# Patient Record
Sex: MALE | Race: BLACK OR AFRICAN AMERICAN | NOT HISPANIC OR LATINO | URBAN - METROPOLITAN AREA
[De-identification: names, ages, dates, MRNs, and addresses within clinical notes are randomized per-mention and may not be internally consistent; named-entity substitution may affect disease eponyms.]

---

## 2017-12-09 ENCOUNTER — EMERGENCY (EMERGENCY)
Facility: HOSPITAL | Age: 32
LOS: 1 days | Discharge: HOME | End: 2017-12-09
Attending: EMERGENCY MEDICINE | Admitting: EMERGENCY MEDICINE
Payer: COMMERCIAL

## 2017-12-09 VITALS
DIASTOLIC BLOOD PRESSURE: 68 MMHG | SYSTOLIC BLOOD PRESSURE: 127 MMHG | RESPIRATION RATE: 18 BRPM | TEMPERATURE: 98 F | HEART RATE: 75 BPM | OXYGEN SATURATION: 100 %

## 2017-12-09 VITALS
HEART RATE: 95 BPM | OXYGEN SATURATION: 96 % | TEMPERATURE: 98 F | SYSTOLIC BLOOD PRESSURE: 129 MMHG | DIASTOLIC BLOOD PRESSURE: 89 MMHG

## 2017-12-09 PROCEDURE — 99285 EMERGENCY DEPT VISIT HI MDM: CPT | Mod: 25

## 2017-12-09 PROCEDURE — 93010 ELECTROCARDIOGRAM REPORT: CPT

## 2017-12-09 PROCEDURE — 93005 ELECTROCARDIOGRAM TRACING: CPT

## 2017-12-09 PROCEDURE — 99284 EMERGENCY DEPT VISIT MOD MDM: CPT | Mod: 25

## 2017-12-09 PROCEDURE — 94640 AIRWAY INHALATION TREATMENT: CPT

## 2017-12-09 PROCEDURE — 71020: CPT | Mod: 26

## 2017-12-09 PROCEDURE — 71046 X-RAY EXAM CHEST 2 VIEWS: CPT

## 2017-12-09 RX ORDER — IPRATROPIUM/ALBUTEROL SULFATE 18-103MCG
3 AEROSOL WITH ADAPTER (GRAM) INHALATION ONCE
Qty: 0 | Refills: 0 | Status: COMPLETED | OUTPATIENT
Start: 2017-12-09 | End: 2017-12-09

## 2017-12-09 RX ADMIN — Medication 3 MILLILITER(S): at 04:12

## 2017-12-09 NOTE — ED ADULT NURSE NOTE - OBJECTIVE STATEMENT
Pt is a 32YOM hx of asthma  received ambulatory A&Ox4 complaining of SOB. patient states  SOB started this evening and progressively became worst. patient his inhaler without relief. wheezing noted, bilaterally. abdomen soft nontender nondistended. Pt denies any headache, dizziness chest pain  nausea or vomiting. patient placed on cardiac monitor, albuterol administered. will monitor support and safety provided. closely.

## 2017-12-09 NOTE — ED PROVIDER NOTE - ATTENDING CONTRIBUTION TO CARE
I was physically present for the E/M service provided. I agree with above history, physical, and plan which I have reviewed and edited where appropriate. I was physically present for the key portions of the service provided.    32M PMH of asthma p/w SOB after playing drums. No cough. No CP. CXR clears. Scant wheezes on exam throughout chest but moving good air. Able to speak in full sentences. Afebrile. Sx improved with albuterol NMT. Albuterol MDI outpt.

## 2017-12-09 NOTE — ED PROVIDER NOTE - OBJECTIVE STATEMENT
32M with past medical history asthma t/w albuterol prn presents with shortness of breath, chest tightness, chest pressure that started 5hrs ago while playing drums.  Took 2 puffs of albuterol and symptoms resolved then came back when started playing drums again.  Finished his set and came ED 4hrs later.  No current shortness of breath or chest pressure but feels tightness.  Never before been intubated or admitted.  Has had nasal congestion, mild cough and rhinorrhea for past 7d.  Denies fever, chills, headache, n/v, arm pain, jaw pain, calf pain, pleuritic pain, palpitations, recent travel.

## 2017-12-09 NOTE — ED PROVIDER NOTE - PROGRESS NOTE DETAILS
Sleeping. Breathing improved after albuterol NMT. Moving good air with mild scant wheezing on auscultation. CXR clear. Has albuterol MDI at home.

## 2019-03-13 ENCOUNTER — EMERGENCY (EMERGENCY)
Facility: HOSPITAL | Age: 34
LOS: 1 days | Discharge: HOME | End: 2019-03-13
Attending: STUDENT IN AN ORGANIZED HEALTH CARE EDUCATION/TRAINING PROGRAM
Payer: COMMERCIAL

## 2019-03-13 VITALS
SYSTOLIC BLOOD PRESSURE: 119 MMHG | TEMPERATURE: 98 F | DIASTOLIC BLOOD PRESSURE: 72 MMHG | WEIGHT: 315 LBS | HEIGHT: 71 IN | RESPIRATION RATE: 16 BRPM | HEART RATE: 71 BPM | OXYGEN SATURATION: 96 %

## 2019-03-13 VITALS
SYSTOLIC BLOOD PRESSURE: 117 MMHG | HEART RATE: 70 BPM | DIASTOLIC BLOOD PRESSURE: 76 MMHG | TEMPERATURE: 98 F | OXYGEN SATURATION: 98 % | RESPIRATION RATE: 16 BRPM

## 2019-03-13 PROBLEM — J45.909 UNSPECIFIED ASTHMA, UNCOMPLICATED: Chronic | Status: ACTIVE | Noted: 2017-12-09

## 2019-03-13 PROCEDURE — 99283 EMERGENCY DEPT VISIT LOW MDM: CPT | Mod: 25

## 2019-03-13 PROCEDURE — 99282 EMERGENCY DEPT VISIT SF MDM: CPT

## 2019-03-13 RX ORDER — BENZOCAINE AND MENTHOL 5; 1 G/100ML; G/100ML
1 LIQUID ORAL ONCE
Qty: 0 | Refills: 0 | Status: COMPLETED | OUTPATIENT
Start: 2019-03-13 | End: 2019-03-13

## 2019-03-13 RX ORDER — ACETAMINOPHEN 500 MG
650 TABLET ORAL ONCE
Qty: 0 | Refills: 0 | Status: COMPLETED | OUTPATIENT
Start: 2019-03-13 | End: 2019-03-13

## 2019-03-13 RX ADMIN — BENZOCAINE AND MENTHOL 1 LOZENGE: 5; 1 LIQUID ORAL at 05:42

## 2019-03-13 RX ADMIN — Medication 650 MILLIGRAM(S): at 05:42

## 2019-03-13 NOTE — ED ADULT NURSE NOTE - OBJECTIVE STATEMENT
Pt is 33 Y A&O x3 M with no PMH presenting to ED c/o 4/10 throat pain an R sided neck pain x3 days. Pt states "there were no doctors offices open so I decided to come in tonight." Pt states "my throat hurts when I swallow." Pt denies cough, nasal drainage, fevers at home, SOB, CP, N/V/D. Pt is breathing unlabored on RA. VSS. Airway intact. Lungs CTA. Throat is pink, moist mucous membranes. No swelling noted to airway. No obvious swelling noted to R neck when compared to L neck. Skin is warm and dry and of appropriate color to ethnicity. No abrasions/drainage. Safety and comfort maintained.

## 2019-03-13 NOTE — ED PROVIDER NOTE - ATTENDING CONTRIBUTION TO CARE
32 yo M with 3 days neck pain 1 day pain with swallowing. no hx of trauma, no fever chills, pain with ROM, HA, sob, cp, n/v. tolerating PO.  patient in NAD. no facial asymmetry, no malocclusion, drooling or trismus, OP wnl, + right submandibular LAD. FROM of neck, s1s2 rrr, lungs cta, abd soft, nt  LAD likely viral. symptom relief. pcp f/u. Return precautions were discussed with patient.

## 2019-03-13 NOTE — ED PROVIDER NOTE - OBJECTIVE STATEMENT
34yo no pmh presenting with 3d of right anterior neck pain(below mandible) at one focal spot. 1d of mild pain with swallowing, but no trouble swallowing. NO other symptoms. No night sweats, fever, cp, headache. + runny nose.

## 2019-03-13 NOTE — ED ADULT NURSE NOTE - NS_ED_NURSE_TEACHING_TOPIC_ED_A_ED
follow up with PMD. Take tylenol OTC for pain management. Pt verbalized understanding to return to ED for chest pain, SOB, difficulty speaking, difficulty breathing, or difficulty swallowing,.

## 2019-03-13 NOTE — ED ADULT NURSE NOTE - CHPI ED NUR SYMPTOMS NEG
no chills/no nausea/no syncope/no vomiting/no weakness/no loss of consciousness/no numbness/no bleeding gums/no fever

## 2019-03-13 NOTE — ED PROVIDER NOTE - CLINICAL SUMMARY MEDICAL DECISION MAKING FREE TEXT BOX
Neck pain x3d, sore throat x1d. Exam mild tenderness focal spot under mandible. Suspect reactive lymphadenopathy. NAD. Symptom management, f/u with PMD.

## 2019-03-13 NOTE — ED PROVIDER NOTE - PHYSICAL EXAMINATION
Gen: No acute distress, alert, cooperative  HENT: Normocephalic, atraumatic. External ears normal, no rhinorrhea, moist mucous membranes, normal conjunctiva, uvula midline. right lymphadenopathy below angle of mandible, slightly tender. No intra-oral swelling.   Eyes: PERRLA, EOMI    Resp: CTAB, non-labored, speaking without difficulty on room air, no wheeze  CV: rrr, no murmur  Abd: soft, NTND, no rebound or guarding  MSK: No CVAT bilaterally, no midline ttp  Skin: warm and dry  Neuro: AOx3, speech is fluent and appropriate, no focal deficits  Psych: Normal affect

## 2020-07-04 ENCOUNTER — EMERGENCY (EMERGENCY)
Facility: HOSPITAL | Age: 35
LOS: 1 days | Discharge: HOME | End: 2020-07-04
Attending: EMERGENCY MEDICINE
Payer: COMMERCIAL

## 2020-07-04 VITALS
TEMPERATURE: 98 F | DIASTOLIC BLOOD PRESSURE: 78 MMHG | WEIGHT: 270.07 LBS | RESPIRATION RATE: 16 BRPM | OXYGEN SATURATION: 96 % | SYSTOLIC BLOOD PRESSURE: 123 MMHG | HEART RATE: 81 BPM

## 2020-07-04 VITALS
RESPIRATION RATE: 17 BRPM | SYSTOLIC BLOOD PRESSURE: 117 MMHG | DIASTOLIC BLOOD PRESSURE: 83 MMHG | OXYGEN SATURATION: 99 % | HEART RATE: 88 BPM

## 2020-07-04 LAB
ALBUMIN SERPL ELPH-MCNC: 4.5 G/DL — SIGNIFICANT CHANGE UP (ref 3.3–5)
ALP SERPL-CCNC: 90 U/L — SIGNIFICANT CHANGE UP (ref 40–120)
ALT FLD-CCNC: 29 U/L — SIGNIFICANT CHANGE UP (ref 10–45)
ANION GAP SERPL CALC-SCNC: 9 MMOL/L — SIGNIFICANT CHANGE UP (ref 5–17)
AST SERPL-CCNC: 26 U/L — SIGNIFICANT CHANGE UP (ref 10–40)
BASOPHILS # BLD AUTO: 0.04 K/UL — SIGNIFICANT CHANGE UP (ref 0–0.2)
BASOPHILS NFR BLD AUTO: 0.7 % — SIGNIFICANT CHANGE UP (ref 0–2)
BILIRUB SERPL-MCNC: 0.4 MG/DL — SIGNIFICANT CHANGE UP (ref 0.2–1.2)
BUN SERPL-MCNC: 19 MG/DL — SIGNIFICANT CHANGE UP (ref 7–23)
CALCIUM SERPL-MCNC: 9.3 MG/DL — SIGNIFICANT CHANGE UP (ref 8.4–10.5)
CHLORIDE SERPL-SCNC: 101 MMOL/L — SIGNIFICANT CHANGE UP (ref 96–108)
CO2 SERPL-SCNC: 29 MMOL/L — SIGNIFICANT CHANGE UP (ref 22–31)
CREAT SERPL-MCNC: 1.22 MG/DL — SIGNIFICANT CHANGE UP (ref 0.5–1.3)
EOSINOPHIL # BLD AUTO: 0.43 K/UL — SIGNIFICANT CHANGE UP (ref 0–0.5)
EOSINOPHIL NFR BLD AUTO: 7.1 % — HIGH (ref 0–6)
GLUCOSE SERPL-MCNC: 89 MG/DL — SIGNIFICANT CHANGE UP (ref 70–99)
HCT VFR BLD CALC: 40.7 % — SIGNIFICANT CHANGE UP (ref 39–50)
HGB BLD-MCNC: 13.5 G/DL — SIGNIFICANT CHANGE UP (ref 13–17)
IMM GRANULOCYTES NFR BLD AUTO: 0.2 % — SIGNIFICANT CHANGE UP (ref 0–1.5)
LYMPHOCYTES # BLD AUTO: 1.63 K/UL — SIGNIFICANT CHANGE UP (ref 1–3.3)
LYMPHOCYTES # BLD AUTO: 27.1 % — SIGNIFICANT CHANGE UP (ref 13–44)
MCHC RBC-ENTMCNC: 29.9 PG — SIGNIFICANT CHANGE UP (ref 27–34)
MCHC RBC-ENTMCNC: 33.2 GM/DL — SIGNIFICANT CHANGE UP (ref 32–36)
MCV RBC AUTO: 90 FL — SIGNIFICANT CHANGE UP (ref 80–100)
MONOCYTES # BLD AUTO: 0.45 K/UL — SIGNIFICANT CHANGE UP (ref 0–0.9)
MONOCYTES NFR BLD AUTO: 7.5 % — SIGNIFICANT CHANGE UP (ref 2–14)
NEUTROPHILS # BLD AUTO: 3.46 K/UL — SIGNIFICANT CHANGE UP (ref 1.8–7.4)
NEUTROPHILS NFR BLD AUTO: 57.4 % — SIGNIFICANT CHANGE UP (ref 43–77)
NRBC # BLD: 0 /100 WBCS — SIGNIFICANT CHANGE UP (ref 0–0)
PLATELET # BLD AUTO: 229 K/UL — SIGNIFICANT CHANGE UP (ref 150–400)
POTASSIUM SERPL-MCNC: 3.8 MMOL/L — SIGNIFICANT CHANGE UP (ref 3.5–5.3)
POTASSIUM SERPL-SCNC: 3.8 MMOL/L — SIGNIFICANT CHANGE UP (ref 3.5–5.3)
PROT SERPL-MCNC: 7.8 G/DL — SIGNIFICANT CHANGE UP (ref 6–8.3)
RBC # BLD: 4.52 M/UL — SIGNIFICANT CHANGE UP (ref 4.2–5.8)
RBC # FLD: 12.3 % — SIGNIFICANT CHANGE UP (ref 10.3–14.5)
SARS-COV-2 RNA SPEC QL NAA+PROBE: SIGNIFICANT CHANGE UP
SODIUM SERPL-SCNC: 139 MMOL/L — SIGNIFICANT CHANGE UP (ref 135–145)
WBC # BLD: 6.02 K/UL — SIGNIFICANT CHANGE UP (ref 3.8–10.5)
WBC # FLD AUTO: 6.02 K/UL — SIGNIFICANT CHANGE UP (ref 3.8–10.5)

## 2020-07-04 PROCEDURE — 71046 X-RAY EXAM CHEST 2 VIEWS: CPT

## 2020-07-04 PROCEDURE — 93010 ELECTROCARDIOGRAM REPORT: CPT

## 2020-07-04 PROCEDURE — U0003: CPT

## 2020-07-04 PROCEDURE — 80053 COMPREHEN METABOLIC PANEL: CPT

## 2020-07-04 PROCEDURE — 99285 EMERGENCY DEPT VISIT HI MDM: CPT

## 2020-07-04 PROCEDURE — 93005 ELECTROCARDIOGRAM TRACING: CPT

## 2020-07-04 PROCEDURE — 99283 EMERGENCY DEPT VISIT LOW MDM: CPT | Mod: 25

## 2020-07-04 PROCEDURE — 85027 COMPLETE CBC AUTOMATED: CPT

## 2020-07-04 PROCEDURE — 94640 AIRWAY INHALATION TREATMENT: CPT

## 2020-07-04 PROCEDURE — 71046 X-RAY EXAM CHEST 2 VIEWS: CPT | Mod: 26

## 2020-07-04 RX ORDER — ALBUTEROL 90 UG/1
4 AEROSOL, METERED ORAL ONCE
Refills: 0 | Status: COMPLETED | OUTPATIENT
Start: 2020-07-04 | End: 2020-07-04

## 2020-07-04 RX ADMIN — ALBUTEROL 4 PUFF(S): 90 AEROSOL, METERED ORAL at 12:06

## 2020-07-04 RX ADMIN — Medication 40 MILLIGRAM(S): at 12:06

## 2020-07-04 NOTE — ED ADULT NURSE NOTE - CHPI ED NUR SYMPTOMS NEG
no wheezing/no cough/no headache/no body aches/no chest pain/no hemoptysis/no chills/no diaphoresis/no edema/no fever

## 2020-07-04 NOTE — ED PROVIDER NOTE - PATIENT PORTAL LINK FT
You can access the FollowMyHealth Patient Portal offered by [facility] by registering at the following website: [email or web address]. By joining [email or web address]’s FollowMyHealth portal, you will also be able to view your health information using other applications (apps) compatible with our system.

## 2020-07-04 NOTE — ED ADULT NURSE NOTE - ISOLATION TYPE:
Message    Received e-mail re: patient's frustration re: orders for referrals and lab orders. Left voicemail on patient's cell phone that I would try calling her again later this evening.   Again, called patient later this evening around 7pm, reaching voicemail. Left message that I hoped that she was feeling giorgio and that someone from [facility] would be contacting her on 12/6/17. I received an e-mail from [facility] on 12/5/17 that someone from the office would be contacting the patient on 12/6/17.   Spoke with patient re: yesterday's mishap re: cholesterol lab orders, frustration with referrals. I acknowledged her frustration and disappointment in lab orders not being placed despite documentation that these were to be ordered. We agreed there was a miscommunication about whether or not there was a need to contact the  re: insurance approval for referrals/consultants should she not be improving clinically. Patient reports that she had disappointing news from her ENT appointment that contributed to the stress and frustration of the situation re: lab orders and referral orders. Regardless, patient remains upset with the overall situation and would like to not relive the issue further.     Signatures   Electronically signed by : KYA MCELROY MD; Dec  6 2017 12:05PM CST     Airborne+Contact precautions

## 2020-07-04 NOTE — ED PROVIDER NOTE - PROGRESS NOTE DETAILS
Dr. Edgar Duncan, PGY-3: labs and CXR unremarkable. Ambulatory pulse ox 99%. Pt feeling improved. Symptoms likely seasonal allergic reaction. Will DC w/ short course of steroids and recommendations for PCP follow up. Return precautions provided, pt verbalized understanding. Ready for DC. Attending note (Mehdi): agree with above; Results of ED evaluation including labs d/w patient, who verbalizes understanding of ED evaluation and discharge plan including medications, follow-up instructions and return precautions.

## 2020-07-04 NOTE — ED PROVIDER NOTE - ATTENDING CONTRIBUTION TO CARE
Attending Statement (TERESSA Harding MD):    HPI: 35y/o M with h/o Asthma, presenting with SOB for the past several days; reports feels like prior asthma symptoms over the past few days; no fever/chills, +nasal congestion (chronic, on nasal spray -?flonase); no cough, no chest pain/tightness, no recent travel; works for [employer] in city.  No loss of sense of smell.  No headache, neck pain/stiffness, abdominal pain, n/v/d.  +rash (states few bumps on arm, neck) which is itchy, no bleeding/drainage.    Review of Systems:  -General: no fever or chills  -ENT: no congestion, no difficulty swallowing  -Pulmonary: no cough, + shortness of breath  -Cardiac: no chest pain, no palpitations  -Gastrointestinal: no abdominal pain, no nausea, no vomiting, and no diarrhea.  -Genitourinary: no blood or pain with urination  -Musculoskeletal: no back or neck pain  -Skin: + rashes (See hpi)  -Endocrine: No h/o diabetes or thyroid disease  -Neurologic: No focal weakness or numbness    PSH: gastric band (Jan 2020)    On Physical Exam:  General: well appearing, in NAD, speaking clearly in full sentences and without difficulty; cooperative with exam  HEENT: PERRL, MMM, airway patent.  Posterior pharynx is erythematous without vesicles or exudates, no appreciable tonsillar/uvular enlargement or deviation.  Neck: no neck tenderness, no nuchal rigidity  Cardiac: normal s1, s2; RRR; no MGR  Lungs: CTABL  Abdomen: soft nontender/nondistended  : no bladder tenderness or distension  Skin: intact, no urticaria, no macules, no sloughing/vesicles; single excoriated appearing papule of ~1mm in mid upper left extremity (over lateral prox humerus region) with no surrounding erythema.  Few areas of darkened/hyperpigmented skin on left lateral neck, ? 2 small papules, not darkened.  No splinter hemorrhages, no rashes/papules/skin changes on palms/soles. papules are nontender, nonraised.  Extremities: no peripheral edema, no gross deformities    MDM: 34M c/o SOB and rash; SOB symptoms reminiscent of his asthma and with improvement with albuterol; rash is questionable, few small papules noted of unclear significance, no areas of macules, no diffuse rash, no sloughing, no vesicles, no evidence of TEN/SJE, not c/w septic emboli, not c/w purpura.  Will obtain labs: cbc (r/o anemia, r/o thrombocytopenia), CMP (to evaluate for electrolyte abnormalities or renal/liver dysfunction), and obtain CXR (eval for consolidation).  Consider COVID swab given ongoing pandemic with community spread.  TRial prednisone (for itching and for asthma) and albuterol MDI; not likely severe asthma exacerbation given well appearing, clear lungs (though immediately after self-dosing of albuterol). Will r/a after initial labs and x-ray and if improving, dc home with outpatient f/u.  Patient's description of symptoms, minimal risk factors (no recent immobilization and last surgery ~6mo/ago) and overall clinical picture are not consistent with a pulmonary embolism.

## 2020-07-04 NOTE — ED ADULT NURSE NOTE - OBJECTIVE STATEMENT
34y old male PMH Asthma, gastric sleeve  walk in from triage c/o shortness of breath. A&Ox3. Patient states that starting a couple of days ago began having shortness of breathe, no relief with asthma pump, unknown exposure to COVID-19, c/o pruritic rash on back spreading to arms and right thigh. Patient denies chest pain, ha, n/v/d, abdominal pain, f/c, urinary symptoms, hematuria. Patient is well appearing,  afebrile, able to speak in full sentences, lung sounds clear b/t, equal chest rise, unlabored breathing SpO2 above 98% on room air, small dark patches present on back and right thigh. IV inserted, pulse ox in place.

## 2020-07-04 NOTE — ED PROVIDER NOTE - CLINICAL SUMMARY MEDICAL DECISION MAKING FREE TEXT BOX
35 y/o M w/ PMH of asthma, gastric sleeve presenting w/ shortness of breath and itching. Pt well appearing on exam. Lungs CTA. Possible seasonal allergy reaction given SOB, urticaria, and post nasal drip. Possible asthma exacerbation. Possible URI. Pt works as [employer] worker. No known direct COVID exposure but he is in higher risk population due to his job. Plan for labs, CXR, EKG, albuterol, steroids, and COVID swab. Will reassess the need for additional interventions as clinically warranted.

## 2020-07-04 NOTE — ED PROVIDER NOTE - PHYSICAL EXAMINATION
Gen: NAD, AOx3, able to make needs known, non-toxic  Head: NCAT  HEENT: EOMI, oral mucosa moist, posterior oropharynx mildly erythematous, tonsils w/o exudate, normal conjunctiva  Lung: CTAB, no respiratory distress, no wheezes/rhonchi/rales B/L, speaking in full sentences  CV: RRR, no murmurs  Abd: soft, NTND, no guarding  MSK: no visible deformities  Neuro: No focal sensory or motor deficits  Skin: Warm, well perfused, no rash. L upper arm: single 1 mm red macule. L upper back: skin colored small cluster of 2 mm macules. R thigh: skin colored, 3 mm macule  Psych: normal affect

## 2020-07-04 NOTE — ED PROVIDER NOTE - ADDITIONAL NOTES AND INSTRUCTIONS:
Return to work as instructed by your job's policies. Please return to work as instructed by your job's policy

## 2020-07-04 NOTE — ED PROVIDER NOTE - OBJECTIVE STATEMENT
35 y/o M w/ PMH of asthma, gastric sleeve surgery presenting w/ shortness of breath. [place] room 12. Pt reports over the past few days, developing worsening shortness of breath. Has been using his inhaler, but w/ little relief. Last took one puff of albuterol prior to coming to ED. Reports does feel like prior asthma episodes. Denies wheezing, but stated he does not always wheeze with his asthma. Reports post nasal drip. Also complaining of scattered areas of itchiness. L upper arm, L upper back, R thigh. Allergic to seafood only, no recent exposure. Works for [employer] in [place]. No known direct COVID exposure. Denies fevers, chills, headache, dizziness, blurred vision, chest pain, cough, abdominal pain, n/v/d/c, urinary symptoms, MSK pain, rash.

## 2020-07-04 NOTE — ED PROVIDER NOTE - NS ED ROS FT
GENERAL: No fever or chills  EYES: No change in vision  HEENT: No trouble swallowing or speaking  CARDIAC: No chest pain  PULMONARY: No cough, SOB  GI: No abdominal pain, no nausea or no vomiting, no diarrhea or constipation  : No changes in urination  SKIN: No rashes, +itching  NEURO: No headache, no numbness  MSK: No joint pain  Otherwise as HPI or negative.

## 2020-07-04 NOTE — ED PROVIDER NOTE - NSFOLLOWUPINSTRUCTIONS_ED_ALL_ED_FT
Shortness of breath    Shortness of breath (dyspnea) means you have trouble breathing and could indicate a medical problem. Causes include lung disease, heart disease, low amount of red blood cells (anemia), poor physical fitness, being overweight, smoking, etc. Your health care provider today may not be able to find a cause for your shortness of breath after your exam. In this case, it is important to have a follow-up exam with your primary care physician as instructed. If medicines were prescribed, take them as directed for the full length of time directed. Refrain from tobacco products.    SEEK IMMEDIATE MEDICAL CARE IF YOU HAVE ANY OF THE FOLLOWING SYMPTOMS: worsening shortness of breath, chest pain, back pain, abdominal pain, fever, coughing up blood, lightheadedness/dizziness.     1) Follow up with your doctor next weeks  2) Return to the ED immediately for new or worsening symptoms  3) Please continue to take any home medications as prescribed  4) Your test results from your ED visit were discussed with you prior to discharge  5) You were provided with a copy of your test results  6) Please  your medication at the pharmacy and take as directed  7) Please use your inhaler as needed for shortness of breath. Please use 2 pumps every 4 hours as needed. If you begin to need to use your inhaler more frequently than every 2 hours please seek medical care  8) You were swabbed for COVID-19, but the results are not yet available. Your phone number was put into the computer and you should receive a call with the results of the test when it is completed

## 2020-11-24 ENCOUNTER — EMERGENCY (EMERGENCY)
Facility: HOSPITAL | Age: 35
LOS: 1 days | Discharge: HOME | End: 2020-11-24
Attending: EMERGENCY MEDICINE
Payer: COMMERCIAL

## 2020-11-24 VITALS
RESPIRATION RATE: 18 BRPM | OXYGEN SATURATION: 99 % | DIASTOLIC BLOOD PRESSURE: 85 MMHG | WEIGHT: 265 LBS | TEMPERATURE: 98 F | HEART RATE: 62 BPM | HEIGHT: 72 IN | SYSTOLIC BLOOD PRESSURE: 128 MMHG

## 2020-11-24 PROCEDURE — 73140 X-RAY EXAM OF FINGER(S): CPT | Mod: 26,RT

## 2020-11-24 PROCEDURE — 12001 RPR S/N/AX/GEN/TRNK 2.5CM/<: CPT | Mod: F7

## 2020-11-24 PROCEDURE — 90715 TDAP VACCINE 7 YRS/> IM: CPT

## 2020-11-24 PROCEDURE — 12001 RPR S/N/AX/GEN/TRNK 2.5CM/<: CPT

## 2020-11-24 PROCEDURE — 99283 EMERGENCY DEPT VISIT LOW MDM: CPT | Mod: 25

## 2020-11-24 PROCEDURE — 90471 IMMUNIZATION ADMIN: CPT

## 2020-11-24 PROCEDURE — 73140 X-RAY EXAM OF FINGER(S): CPT

## 2020-11-24 RX ORDER — LIDOCAINE HCL 20 MG/ML
2 VIAL (ML) INJECTION ONCE
Refills: 0 | Status: COMPLETED | OUTPATIENT
Start: 2020-11-24 | End: 2020-11-24

## 2020-11-24 RX ORDER — ACETAMINOPHEN 500 MG
975 TABLET ORAL ONCE
Refills: 0 | Status: COMPLETED | OUTPATIENT
Start: 2020-11-24 | End: 2020-11-24

## 2020-11-24 RX ORDER — TETANUS TOXOID, REDUCED DIPHTHERIA TOXOID AND ACELLULAR PERTUSSIS VACCINE, ADSORBED 5; 2.5; 8; 8; 2.5 [IU]/.5ML; [IU]/.5ML; UG/.5ML; UG/.5ML; UG/.5ML
0.5 SUSPENSION INTRAMUSCULAR ONCE
Refills: 0 | Status: COMPLETED | OUTPATIENT
Start: 2020-11-24 | End: 2020-11-24

## 2020-11-24 RX ADMIN — TETANUS TOXOID, REDUCED DIPHTHERIA TOXOID AND ACELLULAR PERTUSSIS VACCINE, ADSORBED 0.5 MILLILITER(S): 5; 2.5; 8; 8; 2.5 SUSPENSION INTRAMUSCULAR at 11:18

## 2020-11-24 RX ADMIN — Medication 975 MILLIGRAM(S): at 11:17

## 2020-11-24 RX ADMIN — Medication 2 MILLILITER(S): at 11:17

## 2020-11-24 NOTE — ED PROVIDER NOTE - CARE PLAN
Principal Discharge DX:	Laceration of right middle finger without foreign body without damage to nail, initial encounter

## 2020-11-24 NOTE — ED PROVIDER NOTE - PATIENT PORTAL LINK FT
You can access the FollowMyHealth Patient Portal offered by [facility] by registering at the following website: [email or web address]. By joining "[employer]"’s FollowMyHealth portal, you will also be able to view your health information using other applications (apps) compatible with our system.

## 2020-11-24 NOTE — ED PROVIDER NOTE - OBJECTIVE STATEMENT
Pt got is R middle finger caught in a door when leaving for work last night. Occurred approx 10pm. Has been oozing since without complete resolution. +Pain and swelling. No numbness/tingling. Taking aspirin for pain with some relief. No other trauma. Unknown last tetanus.

## 2020-11-24 NOTE — ED PROVIDER NOTE - NSFOLLOWUPINSTRUCTIONS_ED_ALL_ED_FT
Thank you for visiting our Emergency Department, it has been a pleasure taking part in your healthcare.    Please follow up with your Primary Doctor in 2-3 days.    Stitches, Staples, or Adhesive Wound Closure  Doctors use stitches (sutures), staples, and certain glue (skin adhesives) to hold your skin together while it heals (wound closure). You may need this treatment after you have surgery or if you cut your skin accidentally. These methods help your skin heal more quickly. They also make it less likely that you will have a scar.    What are the different kinds of wound closures?  There are many options for wound closure. The one that your doctor uses depends on how deep and large your wound is.    Adhesive Glue   To use this glue to close a wound, your doctor holds the edges of the wound together and paints the glue on the surface of your skin. You may need more than one layer of glue. Then the wound may be covered with a light bandage (dressing).    This type of skin closure may be used for small wounds that are not deep (superficial). Using glue for wound closure is less painful than other methods. It does not require a medicine that numbs the area. This method also leaves nothing to be removed. Adhesive glue is often used for children and on facial wounds.    Adhesive glue cannot be used for wounds that are deep, uneven, or bleeding. It is not used inside of a wound.    Adhesive Strips   These strips are made of sticky (adhesive), porous paper. They are placed across your skin edges like a regular adhesive bandage. You leave them on until they fall off.    Adhesive strips may be used to close very superficial wounds. They may also be used along with sutures to improve closure of your skin edges.    Sutures   Sutures are the oldest method of wound closure. Sutures can be made from natural or synthetic materials. They can be made from a material that your body can break down as your wound heals (absorbable), or they can be made from a material that needs to be removed from your skin (nonabsorbable). They come in many different strengths and sizes.    Your doctor attaches the sutures to a steel needle on one end. Sutures can be passed through your skin, or through the tissues beneath your skin. Then they are tied and cut. Your skin edges may be closed in one continuous stitch or in separate stitches.    Sutures are strong and can be used for all kinds of wounds. Absorbable sutures may be used to close tissues under the skin. The disadvantage of sutures is that they may cause skin reactions that lead to infection. Nonabsorbable sutures need to be removed.    Staples   When surgical staples are used to close a wound, the edges of your skin on both sides of the wound are brought close together. A staple is placed across the wound, and an instrument secures the edges together. Staples are often used to close surgical cuts (incisions).    Staples are faster to use than sutures, and they cause less reaction from your skin. Staples need to be removed using a tool that bends the staples away from your skin.    How do I care for my wound closure?  Take medicines only as told by your doctor.  If you were prescribed an antibiotic medicine for your wound, finish it all even if you start to feel better.  Use ointments or creams only as told by your doctor.  Wash your hands with soap and water before and after touching your wound.  Do not soak your wound in water. Do not take baths, swim, or use a hot tub until your doctor says it is okay.  Ask your doctor when you can start showering. Cover your wound if told by your doctor.  Do not take out your own sutures or staples.  Do not pick at your wound. Picking can cause an infection.  Keep all follow-up visits as told by your doctor. This is important.  How long will I have my wound closure?  Leave adhesive glue on your skin until the glue peels away.  Leave adhesive strips on your skin until they fall off.  Absorbable sutures will dissolve within several days.  Nonabsorbable sutures and staples must be removed. The location of the wound will determine how long they stay in. This can range from several days to a couple of weeks.    YOUR WOUND NEEDS FOLLOW UP FOR A WOUND CHECK, SUTURE REMOVAL IN  _10__ DAYS    IF YOU HAD SUTURES WERE PLACED TODAY:  _________ SUTURES WERE PLACED  When should I seek help for my wound closure?  Contact your doctor if:    You have a fever.  You have chills.  You have redness, puffiness (swelling), or pain at the site of your wound.  You have fluid, blood, or pus coming from your wound.  There is a bad smell coming from your wound.  The skin edges of your wound start to separate after your sutures have been removed.  Your wound becomes thick, raised, and darker in color after your sutures come out (scarring).    This information is not intended to replace advice given to you by your health care provider. Make sure you discuss any questions you have with your health care provider. Thank you for visiting our Emergency Department, it has been a pleasure taking part in your healthcare.    Please follow up with your Primary Doctor in 2-3 days.    Stitches, Staples, or Adhesive Wound Closure  Doctors use stitches (sutures), staples, and certain glue (skin adhesives) to hold your skin together while it heals (wound closure). You may need this treatment after you have surgery or if you cut your skin accidentally. These methods help your skin heal more quickly. They also make it less likely that you will have a scar.    What are the different kinds of wound closures?  There are many options for wound closure. The one that your doctor uses depends on how deep and large your wound is.    Adhesive Glue   To use this glue to close a wound, your doctor holds the edges of the wound together and paints the glue on the surface of your skin. You may need more than one layer of glue. Then the wound may be covered with a light bandage (dressing).    This type of skin closure may be used for small wounds that are not deep (superficial). Using glue for wound closure is less painful than other methods. It does not require a medicine that numbs the area. This method also leaves nothing to be removed. Adhesive glue is often used for children and on facial wounds.    Adhesive glue cannot be used for wounds that are deep, uneven, or bleeding. It is not used inside of a wound.    Adhesive Strips   These strips are made of sticky (adhesive), porous paper. They are placed across your skin edges like a regular adhesive bandage. You leave them on until they fall off.    Adhesive strips may be used to close very superficial wounds. They may also be used along with sutures to improve closure of your skin edges.    Sutures   Sutures are the oldest method of wound closure. Sutures can be made from natural or synthetic materials. They can be made from a material that your body can break down as your wound heals (absorbable), or they can be made from a material that needs to be removed from your skin (nonabsorbable). They come in many different strengths and sizes.    Your doctor attaches the sutures to a steel needle on one end. Sutures can be passed through your skin, or through the tissues beneath your skin. Then they are tied and cut. Your skin edges may be closed in one continuous stitch or in separate stitches.    Sutures are strong and can be used for all kinds of wounds. Absorbable sutures may be used to close tissues under the skin. The disadvantage of sutures is that they may cause skin reactions that lead to infection. Nonabsorbable sutures need to be removed.    Staples   When surgical staples are used to close a wound, the edges of your skin on both sides of the wound are brought close together. A staple is placed across the wound, and an instrument secures the edges together. Staples are often used to close surgical cuts (incisions).    Staples are faster to use than sutures, and they cause less reaction from your skin. Staples need to be removed using a tool that bends the staples away from your skin.    How do I care for my wound closure?  Take medicines only as told by your doctor.  If you were prescribed an antibiotic medicine for your wound, finish it all even if you start to feel better.  Use ointments or creams only as told by your doctor.  Wash your hands with soap and water before and after touching your wound.  Do not soak your wound in water. Do not take baths, swim, or use a hot tub until your doctor says it is okay.  Ask your doctor when you can start showering. Cover your wound if told by your doctor.  Do not take out your own sutures or staples.  Do not pick at your wound. Picking can cause an infection.  Keep all follow-up visits as told by your doctor. This is important.  How long will I have my wound closure?  Leave adhesive glue on your skin until the glue peels away.  Leave adhesive strips on your skin until they fall off.  Follow up with your primary doctor in 10-12 days for suture removal. If the wound becomes red, warm, painful, or oozing pus, please see your doctor sooner.    Absorbable sutures will dissolve within several days.  Nonabsorbable sutures and staples must be removed. The location of the wound will determine how long they stay in. This can range from several days to a couple of weeks.    YOUR WOUND NEEDS FOLLOW UP FOR A WOUND CHECK, SUTURE REMOVAL IN  _10__ DAYS    IF YOU HAD SUTURES WERE PLACED TODAY:  _________ SUTURES WERE PLACED  When should I seek help for my wound closure?  Contact your doctor if:    You have a fever.  You have chills.  You have redness, puffiness (swelling), or pain at the site of your wound.  You have fluid, blood, or pus coming from your wound.  There is a bad smell coming from your wound.  The skin edges of your wound start to separate after your sutures have been removed.  Your wound becomes thick, raised, and darker in color after your sutures come out (scarring).    This information is not intended to replace advice given to you by your health care provider. Make sure you discuss any questions you have with your health care provider.
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
## 2020-11-24 NOTE — ED PROCEDURE NOTE - ATTENDING CONTRIBUTION TO CARE
Attending MD Ninoska Thrasher: Risks, benefit and alternatives of procedure explained to patient and patient demonstrated verbal understanding and consent.  I was present during the key portions of the procedure. Patient tolerated procedure well without complications

## 2020-11-24 NOTE — ED PROVIDER NOTE - MUSCULOSKELETAL, MLM
R middle finger with swelling of distal tuft, mild subungal hematoma. Mild tenderness at tuft, FROM DIP and PIP joints.

## 2021-09-11 ENCOUNTER — EMERGENCY (EMERGENCY)
Facility: HOSPITAL | Age: 36
LOS: 1 days | Discharge: HOME | End: 2021-09-11
Attending: STUDENT IN AN ORGANIZED HEALTH CARE EDUCATION/TRAINING PROGRAM | Admitting: STUDENT IN AN ORGANIZED HEALTH CARE EDUCATION/TRAINING PROGRAM
Payer: SELF-PAY

## 2021-09-11 VITALS
TEMPERATURE: 98 F | WEIGHT: 265 LBS | HEART RATE: 57 BPM | DIASTOLIC BLOOD PRESSURE: 85 MMHG | OXYGEN SATURATION: 97 % | RESPIRATION RATE: 20 BRPM | HEIGHT: 72 IN | SYSTOLIC BLOOD PRESSURE: 126 MMHG

## 2021-09-11 PROCEDURE — 99283 EMERGENCY DEPT VISIT LOW MDM: CPT

## 2021-09-11 PROCEDURE — 99053 MED SERV 10PM-8AM 24 HR FAC: CPT

## 2021-09-11 NOTE — ED PROVIDER NOTE - PHYSICAL EXAMINATION
Gen: AAOx3, non-toxic  Head: NCAT  HEENT: EOMI, oral mucosa moist, normal conjunctiva  Lung: CTAB, no respiratory distress, no wheezes/rhonchi/rales B/L, speaking in full sentences  CV: RRR, no murmurs, rubs or gallops  Abd: soft, NTND, no guarding, no CVA tenderness  MSK: no visible deformities  Neuro: No focal sensory or motor deficits, normal CN exam   Skin: Warm, well perfused, no rash  Psych: normal affect.     Morgan Sands, DO

## 2021-09-11 NOTE — ED PROVIDER NOTE - NSFOLLOWUPINSTRUCTIONS_ED_ALL_ED_FT
Follow up with your PCP in 24-48 hours.   Consider COVID testing in 3 to 4 days.   May take Tylenol and Motrin as directed on the bottle for pain control.   Return to the ER if you develop any new or worsening symptoms such as fever, chest pain, shortness of breath, numbness, weakness, abdominal pain, nausea, vomiting, or visual changes.
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
## 2021-09-11 NOTE — ED PROVIDER NOTE - PATIENT PORTAL LINK FT
You can access the FollowMyHealth Patient Portal offered by [facility] by registering at the following website: [email or web address]. By joining [employer]’s FollowMyHealth portal, you will also be able to view your health information using other applications (apps) compatible with our system.

## 2021-09-11 NOTE — ED PROVIDER NOTE - NS ED ROS FT
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
## 2023-02-13 NOTE — ED PROVIDER NOTE - NS ED ATTENDING STATEMENT MOD
Other Specify
I have personally seen and examined this patient.  I have fully participated in the care of this patient. I have reviewed all pertinent clinical information, including history, physical exam, plan and the Resident’s note and agree except as noted.

## 2023-08-30 NOTE — ED ADULT TRIAGE NOTE - AS TEMP SITE
Airway    Performed by: Flor Paulino CRNA  Authorized by: Arcenio Bosch MD    Final Airway Type:  Mask   Patient Identified, Procedure confirmed, Emergency equipment available and Safety protocols followed  Location:  OR  Indications for Airway Management:  Anesthesia  Spontaneous Ventilation: present    Sedation Level:  Deep  Mask Difficulty Assessment:  1 - vent by mask      
oral

## 2024-09-22 NOTE — ED ADULT NURSE NOTE - CAS ELECT INFOMATION PROVIDED
Problem: Pain  Goal: Acceptable pain level achieved/maintained at rest using appropriate pain scale for the patient  Outcome: Monitoring/Evaluating progress  Note: Pain improving with medication given earlier.    Falls asleep while talking to writer     Problem: Impaired Physical Mobility  Goal: Functional status is maintained or returned to baseline during hospitalization  Outcome: Monitoring/Evaluating progress  Note: Resting in bed.  Report SOB with activities prior to ad mission      DC instructions/Dc by MD Holloway Rn was not present

## 2024-10-14 NOTE — ED ADULT NURSE NOTE - NSFALLRSKHARMRISK_ED_ALL_ED
Upon chart review, noted that patient started amiodarone 10/11/24. Attempted to reach patient a second time to confirm. LM advising patient to call [facility] back as soon as possible regarding new medication start.    no

## 2025-01-09 NOTE — ED ADULT NURSE NOTE - SUICIDE SCREENING DEPRESSION
[No studies available for review at this time.] : No studies available for review at this time.
Negative

## 2025-03-31 NOTE — ED ADULT NURSE NOTE - NSFALLRSKOUTCOME_ED_ALL_ED
impairments found/functional limitations in following categories/rehab potential/therapy frequency/predicted duration of therapy intervention/anticipated discharge recommendation Universal Safety Interventions